# Patient Record
Sex: MALE | Race: WHITE | Employment: FULL TIME
[De-identification: names, ages, dates, MRNs, and addresses within clinical notes are randomized per-mention and may not be internally consistent; named-entity substitution may affect disease eponyms.]

---

## 2022-07-22 ENCOUNTER — NURSE TRIAGE (OUTPATIENT)
Dept: OTHER | Facility: CLINIC | Age: 37
End: 2022-07-22

## 2022-07-22 NOTE — TELEPHONE ENCOUNTER
[facility]. Subjective: Caller states \"I have back pain with new numbness down right arm. \"     Current Symptoms: Back pain behind shoulder right blade numbness radiating down right arm. Onset: 1 week ago; sudden    Associated Symptoms:     Pain Severity: 4/10; nagging; constant    Temperature: Denies    What has been tried: biofreeze, lidocaine patches, Tylenol    Recommended disposition: See PCP within 3 Days    Care advice provided, patient verbalizes understanding; denies any other questions or concerns; instructed to call back for any new or worsening symptoms. Patient/Caller agrees with recommended disposition; writer provided warm transfer to [facility]    Attention Provider: Thank you for allowing me to participate in the care of your patient. The patient was connected to triage in response to information provided to the [facility]. Please do not respond through this encounter as the response is not directed to a shared pool. Patient was told to go to [facility] for evaluation if unable to schedule new patient appointment in three day time frame. Patient declines. States, \"I know the symptoms are from my back. If it gets worse I will call back. \"    Reason for Disposition   Patient wants to be seen    Protocols used: Back Pain-ADULT-[place]